# Patient Record
Sex: MALE | Race: WHITE | NOT HISPANIC OR LATINO | Employment: FULL TIME | URBAN - METROPOLITAN AREA
[De-identification: names, ages, dates, MRNs, and addresses within clinical notes are randomized per-mention and may not be internally consistent; named-entity substitution may affect disease eponyms.]

---

## 2019-11-11 ENCOUNTER — OFFICE VISIT (OUTPATIENT)
Dept: FAMILY MEDICINE CLINIC | Facility: CLINIC | Age: 34
End: 2019-11-11

## 2019-11-11 VITALS
OXYGEN SATURATION: 98 % | TEMPERATURE: 97.9 F | DIASTOLIC BLOOD PRESSURE: 70 MMHG | SYSTOLIC BLOOD PRESSURE: 110 MMHG | WEIGHT: 226.8 LBS | HEART RATE: 94 BPM | BODY MASS INDEX: 31.75 KG/M2 | RESPIRATION RATE: 18 BRPM | HEIGHT: 71 IN

## 2019-11-11 DIAGNOSIS — F41.1 GAD (GENERALIZED ANXIETY DISORDER): Primary | ICD-10-CM

## 2019-11-11 PROCEDURE — 99203 OFFICE O/P NEW LOW 30 MIN: CPT | Performed by: NURSE PRACTITIONER

## 2019-11-11 RX ORDER — BUSPIRONE HYDROCHLORIDE 5 MG/1
5 TABLET ORAL 3 TIMES DAILY
Qty: 90 TABLET | Refills: 5 | Status: SHIPPED | OUTPATIENT
Start: 2019-11-11

## 2019-11-11 RX ORDER — DIPHENHYDRAMINE HCL 25 MG
25 TABLET ORAL EVERY 6 HOURS PRN
COMMUNITY

## 2019-11-11 NOTE — PROGRESS NOTES
Subjective   Adrian Montelongo is a 34 y.o. male.     Chief Complaint   Patient presents with   • Anxiety     Mr. Montelongo presents today to establish care at this practice. His wife has accompanied him today. He states that he is having issues with anxiety and thinks he had severe side effects from taking Zoloft, which he discontinued on his own. His associated symptoms are difficulty concentrating, restlessness, insomnia ( can't fall asleep or is up several times during the night) or he is sleeping all of the time during the day. He reports feeling over whelmed. His wife reports that he can't stop talking. All of these symptoms are affecting his performance at work.     I have reviewed the patient's medical history in detail and updated the computerized patient record.    The following portions of the patient's history were reviewed and updated as appropriate: allergies, current medications, past family history, past medical history, past social history, past surgical history and problem list.      Current Outpatient Medications:   •  diphenhydrAMINE (BENADRYL) 25 MG tablet, Take 25 mg by mouth Every 6 (Six) Hours As Needed for Itching., Disp: , Rfl:   •  busPIRone (BUSPAR) 5 MG tablet, Take 1 tablet by mouth 3 (Three) Times a Day., Disp: 90 tablet, Rfl: 5     Review of Systems   Constitutional: Negative.    Cardiovascular: Negative.    Gastrointestinal: Negative.    Neurological: Negative.    Psychiatric/Behavioral: Positive for agitation, decreased concentration, sleep disturbance and stress. Negative for depressed mood. The patient is nervous/anxious.    All other systems reviewed and are negative.      Objective    Vitals:    11/11/19 1451   BP: 110/70   Pulse: 94   Resp: 18   Temp: 97.9 °F (36.6 °C)   SpO2: 98%     Physical Exam   Constitutional: He is oriented to person, place, and time. He appears well-developed and well-nourished.   HENT:   Right Ear: External ear normal. A middle ear effusion is present.   Left  Ear: External ear normal. A middle ear effusion is present.   Mouth/Throat: Oropharynx is clear and moist.   Neck: Normal range of motion. Neck supple. No thyromegaly present.   Cardiovascular: Normal rate, regular rhythm, normal heart sounds and intact distal pulses.   Pulmonary/Chest: Effort normal and breath sounds normal.   Musculoskeletal: Normal range of motion. He exhibits no edema.   Lymphadenopathy:     He has no cervical adenopathy.   Neurological: He is alert and oriented to person, place, and time.   Skin: Skin is warm and dry.   Psychiatric: Judgment and thought content normal. His mood appears anxious. His speech is rapid and/or pressured. Cognition and memory are normal.   He is talking loudly   Vitals reviewed.        Assessment/Plan   Adrian was seen today for anxiety.    Diagnoses and all orders for this visit:    NATTY (generalized anxiety disorder)  -     busPIRone (BUSPAR) 5 MG tablet; Take 1 tablet by mouth 3 (Three) Times a Day.    Mr. Campbell presents today to establish care at this practice. He is having issues with anxiety which is affecting his daily life. He has stopped taking Zoloft because of side effects. I will start him on Buspirone 5 mg three times a day for anxiety.   I have reviewed his medical history that is available in the Epic EMR system.  He is to follow up in 4 weeks on his anxiety medication changes.

## 2019-11-11 NOTE — PROGRESS NOTES
Subjective   Adrian Montelongo is a 34 y.o. male.     History of Present Illness     {Common H&P Review Areas:69254}    Review of Systems    Objective   Physical Exam      Assessment/Plan   {Assess/PlanSmartLinks:23091}

## 2020-04-25 ENCOUNTER — APPOINTMENT (OUTPATIENT)
Dept: CT IMAGING | Facility: HOSPITAL | Age: 35
End: 2020-04-25

## 2020-04-25 ENCOUNTER — APPOINTMENT (OUTPATIENT)
Dept: GENERAL RADIOLOGY | Facility: HOSPITAL | Age: 35
End: 2020-04-25

## 2020-04-25 ENCOUNTER — HOSPITAL ENCOUNTER (OUTPATIENT)
Facility: HOSPITAL | Age: 35
Setting detail: OBSERVATION
Discharge: LEFT BEFORE DISCHARGE | End: 2020-04-25
Attending: EMERGENCY MEDICINE | Admitting: HOSPITALIST

## 2020-04-25 VITALS
RESPIRATION RATE: 18 BRPM | SYSTOLIC BLOOD PRESSURE: 104 MMHG | HEART RATE: 87 BPM | TEMPERATURE: 98 F | BODY MASS INDEX: 28 KG/M2 | DIASTOLIC BLOOD PRESSURE: 85 MMHG | OXYGEN SATURATION: 94 % | HEIGHT: 71 IN | WEIGHT: 200 LBS

## 2020-04-25 DIAGNOSIS — M54.9 MID BACK PAIN: ICD-10-CM

## 2020-04-25 DIAGNOSIS — R42 VERTIGO: ICD-10-CM

## 2020-04-25 DIAGNOSIS — H53.2 DOUBLE VISION: ICD-10-CM

## 2020-04-25 DIAGNOSIS — H53.8 BLURRY VISION, BILATERAL: Primary | ICD-10-CM

## 2020-04-25 DIAGNOSIS — M54.2 NECK PAIN: ICD-10-CM

## 2020-04-25 PROBLEM — F41.9 ANXIETY: Status: ACTIVE | Noted: 2020-04-25

## 2020-04-25 PROBLEM — F90.9 ADHD (ATTENTION DEFICIT HYPERACTIVITY DISORDER): Status: ACTIVE | Noted: 2020-04-25

## 2020-04-25 LAB
ALBUMIN SERPL-MCNC: 4.1 G/DL (ref 3.5–5.2)
ALBUMIN/GLOB SERPL: 1.4 G/DL
ALP SERPL-CCNC: 77 U/L (ref 39–117)
ALT SERPL W P-5'-P-CCNC: 33 U/L (ref 1–41)
ANION GAP SERPL CALCULATED.3IONS-SCNC: 10.6 MMOL/L (ref 5–15)
ANION GAP SERPL CALCULATED.3IONS-SCNC: 11.2 MMOL/L (ref 5–15)
AST SERPL-CCNC: 19 U/L (ref 1–40)
BASOPHILS # BLD AUTO: 0.05 10*3/MM3 (ref 0–0.2)
BASOPHILS NFR BLD AUTO: 0.5 % (ref 0–1.5)
BILIRUB SERPL-MCNC: 0.4 MG/DL (ref 0.2–1.2)
BUN BLD-MCNC: 11 MG/DL (ref 6–20)
BUN BLD-MCNC: 11 MG/DL (ref 6–20)
BUN/CREAT SERPL: 12.5 (ref 7–25)
BUN/CREAT SERPL: 13.9 (ref 7–25)
CALCIUM SPEC-SCNC: 8.9 MG/DL (ref 8.6–10.5)
CALCIUM SPEC-SCNC: 9.3 MG/DL (ref 8.6–10.5)
CHLORIDE SERPL-SCNC: 100 MMOL/L (ref 98–107)
CHLORIDE SERPL-SCNC: 100 MMOL/L (ref 98–107)
CO2 SERPL-SCNC: 22.8 MMOL/L (ref 22–29)
CO2 SERPL-SCNC: 26.4 MMOL/L (ref 22–29)
CREAT BLD-MCNC: 0.79 MG/DL (ref 0.76–1.27)
CREAT BLD-MCNC: 0.88 MG/DL (ref 0.76–1.27)
DEPRECATED RDW RBC AUTO: 40.9 FL (ref 37–54)
DEPRECATED RDW RBC AUTO: 41 FL (ref 37–54)
EOSINOPHIL # BLD AUTO: 0.07 10*3/MM3 (ref 0–0.4)
EOSINOPHIL NFR BLD AUTO: 0.7 % (ref 0.3–6.2)
ERYTHROCYTE [DISTWIDTH] IN BLOOD BY AUTOMATED COUNT: 13 % (ref 12.3–15.4)
ERYTHROCYTE [DISTWIDTH] IN BLOOD BY AUTOMATED COUNT: 13.2 % (ref 12.3–15.4)
GFR SERPL CREATININE-BSD FRML MDRD: 100 ML/MIN/1.73
GFR SERPL CREATININE-BSD FRML MDRD: 113 ML/MIN/1.73
GLOBULIN UR ELPH-MCNC: 3 GM/DL
GLUCOSE BLD-MCNC: 102 MG/DL (ref 65–99)
GLUCOSE BLD-MCNC: 108 MG/DL (ref 65–99)
HCT VFR BLD AUTO: 43 % (ref 37.5–51)
HCT VFR BLD AUTO: 44.8 % (ref 37.5–51)
HGB BLD-MCNC: 14.4 G/DL (ref 13–17.7)
HGB BLD-MCNC: 14.7 G/DL (ref 13–17.7)
IMM GRANULOCYTES # BLD AUTO: 0.04 10*3/MM3 (ref 0–0.05)
IMM GRANULOCYTES NFR BLD AUTO: 0.4 % (ref 0–0.5)
LYMPHOCYTES # BLD AUTO: 2.17 10*3/MM3 (ref 0.7–3.1)
LYMPHOCYTES NFR BLD AUTO: 22.5 % (ref 19.6–45.3)
MCH RBC QN AUTO: 28.4 PG (ref 26.6–33)
MCH RBC QN AUTO: 28.7 PG (ref 26.6–33)
MCHC RBC AUTO-ENTMCNC: 32.8 G/DL (ref 31.5–35.7)
MCHC RBC AUTO-ENTMCNC: 33.5 G/DL (ref 31.5–35.7)
MCV RBC AUTO: 85.8 FL (ref 79–97)
MCV RBC AUTO: 86.5 FL (ref 79–97)
MONOCYTES # BLD AUTO: 0.88 10*3/MM3 (ref 0.1–0.9)
MONOCYTES NFR BLD AUTO: 9.1 % (ref 5–12)
NEUTROPHILS # BLD AUTO: 6.45 10*3/MM3 (ref 1.7–7)
NEUTROPHILS NFR BLD AUTO: 66.8 % (ref 42.7–76)
NRBC BLD AUTO-RTO: 0 /100 WBC (ref 0–0.2)
PLATELET # BLD AUTO: 223 10*3/MM3 (ref 140–450)
PLATELET # BLD AUTO: 230 10*3/MM3 (ref 140–450)
PMV BLD AUTO: 10.3 FL (ref 6–12)
PMV BLD AUTO: 10.4 FL (ref 6–12)
POTASSIUM BLD-SCNC: 4.2 MMOL/L (ref 3.5–5.2)
POTASSIUM BLD-SCNC: 4.3 MMOL/L (ref 3.5–5.2)
PROT SERPL-MCNC: 7.1 G/DL (ref 6–8.5)
RBC # BLD AUTO: 5.01 10*6/MM3 (ref 4.14–5.8)
RBC # BLD AUTO: 5.18 10*6/MM3 (ref 4.14–5.8)
SODIUM BLD-SCNC: 134 MMOL/L (ref 136–145)
SODIUM BLD-SCNC: 137 MMOL/L (ref 136–145)
TROPONIN T SERPL-MCNC: <0.01 NG/ML (ref 0–0.03)
WBC NRBC COR # BLD: 7.85 10*3/MM3 (ref 3.4–10.8)
WBC NRBC COR # BLD: 9.66 10*3/MM3 (ref 3.4–10.8)

## 2020-04-25 PROCEDURE — 96376 TX/PRO/DX INJ SAME DRUG ADON: CPT

## 2020-04-25 PROCEDURE — 96374 THER/PROPH/DIAG INJ IV PUSH: CPT

## 2020-04-25 PROCEDURE — G0378 HOSPITAL OBSERVATION PER HR: HCPCS

## 2020-04-25 PROCEDURE — 25010000002 HYDROMORPHONE PER 4 MG: Performed by: EMERGENCY MEDICINE

## 2020-04-25 PROCEDURE — 85027 COMPLETE CBC AUTOMATED: CPT | Performed by: NURSE PRACTITIONER

## 2020-04-25 PROCEDURE — 0 IOPAMIDOL PER 1 ML: Performed by: EMERGENCY MEDICINE

## 2020-04-25 PROCEDURE — 25010000002 PROCHLORPERAZINE 10 MG/2ML SOLUTION: Performed by: PHYSICIAN ASSISTANT

## 2020-04-25 PROCEDURE — 85025 COMPLETE CBC W/AUTO DIFF WBC: CPT | Performed by: PHYSICIAN ASSISTANT

## 2020-04-25 PROCEDURE — 0042T HC CT CEREBRAL PERFUSION W/WO CONTRAST: CPT

## 2020-04-25 PROCEDURE — 84484 ASSAY OF TROPONIN QUANT: CPT | Performed by: PHYSICIAN ASSISTANT

## 2020-04-25 PROCEDURE — 99285 EMERGENCY DEPT VISIT HI MDM: CPT

## 2020-04-25 PROCEDURE — 93010 ELECTROCARDIOGRAM REPORT: CPT | Performed by: INTERNAL MEDICINE

## 2020-04-25 PROCEDURE — 70496 CT ANGIOGRAPHY HEAD: CPT

## 2020-04-25 PROCEDURE — 70498 CT ANGIOGRAPHY NECK: CPT

## 2020-04-25 PROCEDURE — 96375 TX/PRO/DX INJ NEW DRUG ADDON: CPT

## 2020-04-25 PROCEDURE — 72050 X-RAY EXAM NECK SPINE 4/5VWS: CPT

## 2020-04-25 PROCEDURE — 25010000002 DIPHENHYDRAMINE PER 50 MG: Performed by: PHYSICIAN ASSISTANT

## 2020-04-25 PROCEDURE — 93005 ELECTROCARDIOGRAM TRACING: CPT | Performed by: PHYSICIAN ASSISTANT

## 2020-04-25 PROCEDURE — 80053 COMPREHEN METABOLIC PANEL: CPT | Performed by: PHYSICIAN ASSISTANT

## 2020-04-25 PROCEDURE — 25010000002 ONDANSETRON PER 1 MG: Performed by: EMERGENCY MEDICINE

## 2020-04-25 PROCEDURE — 72072 X-RAY EXAM THORAC SPINE 3VWS: CPT

## 2020-04-25 PROCEDURE — 80048 BASIC METABOLIC PNL TOTAL CA: CPT | Performed by: NURSE PRACTITIONER

## 2020-04-25 PROCEDURE — 25010000002 HYDROMORPHONE 1 MG/ML SOLUTION: Performed by: EMERGENCY MEDICINE

## 2020-04-25 PROCEDURE — 36415 COLL VENOUS BLD VENIPUNCTURE: CPT | Performed by: NURSE PRACTITIONER

## 2020-04-25 RX ORDER — DEXTROAMPHETAMINE SACCHARATE, AMPHETAMINE ASPARTATE, DEXTROAMPHETAMINE SULFATE AND AMPHETAMINE SULFATE 2.5; 2.5; 2.5; 2.5 MG/1; MG/1; MG/1; MG/1
10 TABLET ORAL 2 TIMES DAILY
COMMUNITY

## 2020-04-25 RX ORDER — ASPIRIN 325 MG
325 TABLET ORAL ONCE
Status: COMPLETED | OUTPATIENT
Start: 2020-04-25 | End: 2020-04-25

## 2020-04-25 RX ORDER — SODIUM CHLORIDE 0.9 % (FLUSH) 0.9 %
10 SYRINGE (ML) INJECTION EVERY 12 HOURS SCHEDULED
Status: DISCONTINUED | OUTPATIENT
Start: 2020-04-25 | End: 2020-04-25 | Stop reason: HOSPADM

## 2020-04-25 RX ORDER — LISINOPRIL 5 MG/1
5 TABLET ORAL DAILY
COMMUNITY

## 2020-04-25 RX ORDER — SODIUM CHLORIDE 0.9 % (FLUSH) 0.9 %
10 SYRINGE (ML) INJECTION AS NEEDED
Status: DISCONTINUED | OUTPATIENT
Start: 2020-04-25 | End: 2020-04-25 | Stop reason: HOSPADM

## 2020-04-25 RX ORDER — BISACODYL 5 MG/1
5 TABLET, DELAYED RELEASE ORAL DAILY PRN
Status: DISCONTINUED | OUTPATIENT
Start: 2020-04-25 | End: 2020-04-25 | Stop reason: HOSPADM

## 2020-04-25 RX ORDER — DIPHENHYDRAMINE HYDROCHLORIDE 50 MG/ML
25 INJECTION INTRAMUSCULAR; INTRAVENOUS ONCE
Status: COMPLETED | OUTPATIENT
Start: 2020-04-25 | End: 2020-04-25

## 2020-04-25 RX ORDER — SODIUM CHLORIDE 9 MG/ML
75 INJECTION, SOLUTION INTRAVENOUS CONTINUOUS
Status: DISCONTINUED | OUTPATIENT
Start: 2020-04-25 | End: 2020-04-25 | Stop reason: HOSPADM

## 2020-04-25 RX ORDER — ONDANSETRON 2 MG/ML
4 INJECTION INTRAMUSCULAR; INTRAVENOUS ONCE
Status: COMPLETED | OUTPATIENT
Start: 2020-04-25 | End: 2020-04-25

## 2020-04-25 RX ORDER — ACETAMINOPHEN 160 MG/5ML
650 SOLUTION ORAL EVERY 4 HOURS PRN
Status: DISCONTINUED | OUTPATIENT
Start: 2020-04-25 | End: 2020-04-25 | Stop reason: HOSPADM

## 2020-04-25 RX ORDER — ACETAMINOPHEN 650 MG/1
650 SUPPOSITORY RECTAL EVERY 4 HOURS PRN
Status: DISCONTINUED | OUTPATIENT
Start: 2020-04-25 | End: 2020-04-25 | Stop reason: HOSPADM

## 2020-04-25 RX ORDER — ONDANSETRON 2 MG/ML
4 INJECTION INTRAMUSCULAR; INTRAVENOUS EVERY 6 HOURS PRN
Status: DISCONTINUED | OUTPATIENT
Start: 2020-04-25 | End: 2020-04-25 | Stop reason: HOSPADM

## 2020-04-25 RX ORDER — CALCIUM CARBONATE 200(500)MG
2 TABLET,CHEWABLE ORAL 2 TIMES DAILY PRN
Status: DISCONTINUED | OUTPATIENT
Start: 2020-04-25 | End: 2020-04-25 | Stop reason: HOSPADM

## 2020-04-25 RX ORDER — PROCHLORPERAZINE EDISYLATE 5 MG/ML
10 INJECTION INTRAMUSCULAR; INTRAVENOUS EVERY 6 HOURS PRN
Status: DISCONTINUED | OUTPATIENT
Start: 2020-04-25 | End: 2020-04-25 | Stop reason: HOSPADM

## 2020-04-25 RX ORDER — HYDROMORPHONE HYDROCHLORIDE 1 MG/ML
0.5 INJECTION, SOLUTION INTRAMUSCULAR; INTRAVENOUS; SUBCUTANEOUS ONCE
Status: COMPLETED | OUTPATIENT
Start: 2020-04-25 | End: 2020-04-25

## 2020-04-25 RX ORDER — ACETAMINOPHEN 325 MG/1
650 TABLET ORAL EVERY 4 HOURS PRN
Status: DISCONTINUED | OUTPATIENT
Start: 2020-04-25 | End: 2020-04-25 | Stop reason: HOSPADM

## 2020-04-25 RX ORDER — ONDANSETRON 4 MG/1
4 TABLET, FILM COATED ORAL EVERY 6 HOURS PRN
Status: DISCONTINUED | OUTPATIENT
Start: 2020-04-25 | End: 2020-04-25 | Stop reason: HOSPADM

## 2020-04-25 RX ADMIN — DIPHENHYDRAMINE HYDROCHLORIDE 25 MG: 50 INJECTION, SOLUTION INTRAMUSCULAR; INTRAVENOUS at 05:16

## 2020-04-25 RX ADMIN — SODIUM CHLORIDE 1000 ML: 9 INJECTION, SOLUTION INTRAVENOUS at 05:15

## 2020-04-25 RX ADMIN — HYDROMORPHONE HYDROCHLORIDE 0.5 MG: 1 INJECTION, SOLUTION INTRAMUSCULAR; INTRAVENOUS; SUBCUTANEOUS at 02:44

## 2020-04-25 RX ADMIN — ONDANSETRON 4 MG: 2 INJECTION INTRAMUSCULAR; INTRAVENOUS at 02:44

## 2020-04-25 RX ADMIN — ACETAMINOPHEN 650 MG: 325 TABLET, FILM COATED ORAL at 09:16

## 2020-04-25 RX ADMIN — Medication 10 ML: at 09:07

## 2020-04-25 RX ADMIN — HYDROMORPHONE HYDROCHLORIDE 1 MG: 1 INJECTION, SOLUTION INTRAMUSCULAR; INTRAVENOUS; SUBCUTANEOUS at 04:53

## 2020-04-25 RX ADMIN — IOPAMIDOL 150 ML: 755 INJECTION, SOLUTION INTRAVENOUS at 03:17

## 2020-04-25 RX ADMIN — PROCHLORPERAZINE EDISYLATE 10 MG: 5 INJECTION INTRAMUSCULAR; INTRAVENOUS at 05:16

## 2020-04-25 RX ADMIN — SODIUM CHLORIDE 75 ML/HR: 9 INJECTION, SOLUTION INTRAVENOUS at 09:12

## 2020-04-25 RX ADMIN — ASPIRIN 325 MG: 325 TABLET ORAL at 05:18

## 2020-04-25 NOTE — H&P
HISTORY AND PHYSICAL   [facility]        Patient Identification:  Name: Adrian Montelongo  Age: 34 y.o.  Sex: male  :  1985  MRN: [id number]                     Primary Care Physician: Cee Elaine APRN    Chief Complaint: Blurred vision and vertigo    History of Present Illness:         The patient is a 34 y.o. male who presents to the ED c/o sudden onset of blurry vision, vertigo, neck pain and mid back pain that occurred approximately 7-1/2 hours prior to coming to the ER while mowing his lawn.  The patient states he did not see a ditch/drop-off and came down hard on his left foot while mowing the lawn.  All his symptoms immediately began after this TITO.  He states initially the blurry vision was mild but has progressed over the past several hours.  He states that this particular time he can see pictures but cannot make out any of the details.  He states his vision is normally 20/20 and he has not had to wear corrective lenses to date.  He denies headache, palpitations, chest pain, abdominal pain, fever/chills.  He is unaware of any exposure to sick contacts prior to the onset of his symptoms.       Past Medical History:  Past Medical History:   Diagnosis Date   • ADHD (attention deficit hyperactivity disorder)    • Anxiety      Past Surgical History:  Past Surgical History:   Procedure Laterality Date   • KNEE SURGERY        Home Meds:  No medications prior to admission.     Current meds    Current Facility-Administered Medications:   •  acetaminophen (TYLENOL) tablet 650 mg, 650 mg, Oral, Q4H PRN, 650 mg at 20 0916 **OR** acetaminophen (TYLENOL) 160 MG/5ML solution 650 mg, 650 mg, Oral, Q4H PRN **OR** acetaminophen (TYLENOL) suppository 650 mg, 650 mg, Rectal, Q4H PRN, Roxanne Ledesma APRN  •  bisacodyl (DULCOLAX) EC tablet 5 mg, 5 mg, Oral, Daily PRN, Roxanne Ledesma APRN  •  calcium carbonate (TUMS) chewable tablet 500 mg (200 mg elemental), 2 tablet, Oral, BID PRN,  Roxanne Ledesma APRN  •  ondansetron (ZOFRAN) tablet 4 mg, 4 mg, Oral, Q6H PRN **OR** ondansetron (ZOFRAN) injection 4 mg, 4 mg, Intravenous, Q6H PRN, Roxanne Ledesma APRN  •  prochlorperazine (COMPAZINE) injection 10 mg, 10 mg, Intravenous, Q6H PRN, Stone Pina III, PA, 10 mg at 04/25/20 0516  •  sodium chloride 0.9 % flush 10 mL, 10 mL, Intravenous, Q12H, Roxanne Ledesma APRN, 10 mL at 04/25/20 0907  •  sodium chloride 0.9 % flush 10 mL, 10 mL, Intravenous, PRN, Roxanne Ledesma APRN  •  sodium chloride 0.9 % infusion, 75 mL/hr, Intravenous, Continuous, Roxanne Ledesma APRN, Last Rate: 75 mL/hr at 04/25/20 0912, 75 mL/hr at 04/25/20 0912    Current Outpatient Medications:   •  amphetamine-dextroamphetamine (ADDERALL) 10 MG tablet, Take 10 mg by mouth 2 (Two) Times a Day., Disp: , Rfl:   •  lisinopril (PRINIVIL,ZESTRIL) 5 MG tablet, Take 5 mg by mouth Daily., Disp: , Rfl:   •  busPIRone (BUSPAR) 5 MG tablet, Take 1 tablet by mouth 3 (Three) Times a Day., Disp: 90 tablet, Rfl: 5  •  diphenhydrAMINE (BENADRYL) 25 MG tablet, Take 25 mg by mouth Every 6 (Six) Hours As Needed for Itching., Disp: , Rfl:   Allergies:  No Known Allergies  Immunizations:    There is no immunization history on file for this patient.  Social History:   Social History     Social History Narrative   • Not on file     Social History     Socioeconomic History   • Marital status:      Spouse name: Not on file   • Number of children: Not on file   • Years of education: Not on file   • Highest education level: Not on file   Tobacco Use   • Smoking status: Never Smoker   • Smokeless tobacco: Never Used   Substance and Sexual Activity   • Alcohol use: No     Frequency: Never   • Drug use: No   • Sexual activity: Yes     Partners: Female       Family History:  Family History   Problem Relation Age of Onset   • Lung cancer Mother    • No Known Problems Father    • Skin cancer Sister    • No Known Problems  "Maternal Grandmother    • Alzheimer's disease Maternal Grandfather    • No Known Problems Paternal Grandmother    • Unexplained death Paternal Grandfather         MVA        Review of Systems  See history of present illness and past medical history.  Patient denies  change in hearing, change in taste, changes in weight, changes in appetite, focal weakness, numbness, or paresthesia.  Patient denies chest pain, palpitations, dyspnea, orthopnea, PND, cough, sinus pressure, rhinorrhea, epistaxis, hemoptysis, nausea, vomiting, hematemesis, diarrhea, constipation or hematchezia.  Denies cold or heat intolerance, polydipsia, polyuria, polyphagia. Denies hematuria, pyuria, dysuria, hesitancy, frequency or urgency.  Remainder of ROS is negative.    Objective:  tMax 24 hrs: Temp (24hrs), Av.4 °F (36.3 °C), Min:96.8 °F (36 °C), Max:98 °F (36.7 °C)    Vitals Ranges:   Temp:  [96.8 °F (36 °C)-98 °F (36.7 °C)] 98 °F (36.7 °C)  Heart Rate:  [] 87  Resp:  [16-18] 18  BP: (102-133)/(75-93) 104/85      Exam:  /85 (Patient Position: Lying)   Pulse 87   Temp 98 °F (36.7 °C) (Oral)   Resp 18   Ht 180.3 cm (71\")   Wt 90.7 kg (200 lb)   SpO2 94%   BMI 27.89 kg/m²     General Appearance:    Alert, cooperative, no distress, appears stated age   Head:    Normocephalic, without obvious abnormality, atraumatic   Eyes:    PERRL, conjunctiva/corneas clear, EOM's intact, both eyes   Ears:    Normal external ear canals, both ears   Nose:   Nares normal, septum midline, mucosa normal, no drainage    or sinus tenderness   Throat:   Lips, mucosa, and tongue normal   Neck:   Supple, symmetrical, trachea midline, no adenopathy;     thyroid:  no enlargement/tenderness/nodules; no carotid    bruit or JVD   Back:     Symmetric, no curvature, ROM normal, no CVA tenderness   Lungs:     Clear to auscultation bilaterally, respirations unlabored   Chest Wall:    No tenderness or deformity    Heart:    Regular rate and rhythm, S1 and S2 " normal, no murmur, rub   or gallop   Abdomen:     Soft, non-tender, bowel sounds active all four quadrants,     no masses, no hepatomegaly, no splenomegaly   Extremities:   Extremities normal, atraumatic, no cyanosis or edema   Pulses:   2+ and symmetric all extremities   Skin:   Skin color, texture, turgor normal, no rashes or lesions   Lymph nodes:   Cervical, supraclavicular, and axillary nodes normal   Neurologic:   CNII-XII intact, normal strength, sensation intact throughout      .    Data Review:  Lab Results (last 72 hours)     Procedure Component Value Units Date/Time    Basic Metabolic Panel [[id number]]  (Abnormal) Collected:  04/25/20 0839    Specimen:  Blood from Hand, Right Updated:  04/25/20 0916     Glucose 102 mg/dL      BUN 11 mg/dL      Creatinine 0.79 mg/dL      Sodium 134 mmol/L      Potassium 4.2 mmol/L      Chloride 100 mmol/L      CO2 22.8 mmol/L      Calcium 8.9 mg/dL      eGFR Non African Amer 113 mL/min/1.73      BUN/Creatinine Ratio 13.9     Anion Gap 11.2 mmol/L     Narrative:       GFR Normal >60  Chronic Kidney Disease <60  Kidney Failure <15      CBC (No Diff) [[id number]]  (Normal) Collected:  04/25/20 0839    Specimen:  Blood Updated:  04/25/20 0851     WBC 7.85 10*3/mm3      RBC 5.01 10*6/mm3      Hemoglobin 14.4 g/dL      Hematocrit 43.0 %      MCV 85.8 fL      MCH 28.7 pg      MCHC 33.5 g/dL      RDW 13.2 %      RDW-SD 41.0 fl      MPV 10.3 fL      Platelets 230 10*3/mm3     Troponin [[id number]]  (Normal) Collected:  04/25/20 0146    Specimen:  Blood Updated:  04/25/20 0551     Troponin T <0.010 ng/mL     Narrative:       Troponin T Reference Range:  <= 0.03 ng/mL-   Negative for AMI  >0.03 ng/mL-     Abnormal for myocardial necrosis.  Clinicians would have to utilize clinical acumen, EKG, Troponin and serial changes to determine if it is an Acute Myocardial Infarction or myocardial injury due to an underlying chronic condition.       Results may be falsely decreased if patient  taking Biotin.      Comprehensive Metabolic Panel [[id number]]  (Abnormal) Collected:  04/25/20 0146    Specimen:  Blood Updated:  04/25/20 0216     Glucose 108 mg/dL      BUN 11 mg/dL      Creatinine 0.88 mg/dL      Sodium 137 mmol/L      Potassium 4.3 mmol/L      Chloride 100 mmol/L      CO2 26.4 mmol/L      Calcium 9.3 mg/dL      Total Protein 7.1 g/dL      Albumin 4.10 g/dL      ALT (SGPT) 33 U/L      AST (SGOT) 19 U/L      Alkaline Phosphatase 77 U/L      Total Bilirubin 0.4 mg/dL      eGFR Non African Amer 100 mL/min/1.73      Globulin 3.0 gm/dL      A/G Ratio 1.4 g/dL      BUN/Creatinine Ratio 12.5     Anion Gap 10.6 mmol/L     Narrative:       GFR Normal >60  Chronic Kidney Disease <60  Kidney Failure <15      CBC & Differential [[id number]] Collected:  04/25/20 0146    Specimen:  Blood Updated:  04/25/20 0155    Narrative:       The following orders were created for panel order CBC & Differential.  Procedure                               Abnormality         Status                     ---------                               -----------         ------                     CBC Auto Differential[[id number]]        Normal              Final result                 Please view results for these tests on the individual orders.    CBC Auto Differential [[id number]]  (Normal) Collected:  04/25/20 0146    Specimen:  Blood Updated:  04/25/20 0155     WBC 9.66 10*3/mm3      RBC 5.18 10*6/mm3      Hemoglobin 14.7 g/dL      Hematocrit 44.8 %      MCV 86.5 fL      MCH 28.4 pg      MCHC 32.8 g/dL      RDW 13.0 %      RDW-SD 40.9 fl      MPV 10.4 fL      Platelets 223 10*3/mm3      Neutrophil % 66.8 %      Lymphocyte % 22.5 %      Monocyte % 9.1 %      Eosinophil % 0.7 %      Basophil % 0.5 %      Immature Grans % 0.4 %      Neutrophils, Absolute 6.45 10*3/mm3      Lymphocytes, Absolute 2.17 10*3/mm3      Monocytes, Absolute 0.88 10*3/mm3      Eosinophils, Absolute 0.07 10*3/mm3      Basophils, Absolute 0.05 10*3/mm3       Immature Grans, Absolute 0.04 10*3/mm3      nRBC 0.0 /100 WBC                    Imaging Results (All)     Procedure Component Value Units Date/Time    CT Angiogram Head [[id number]] Collected:  04/25/20 0330     Updated:  04/25/20 0345    Narrative:       CT ANGIOGRAM HEAD-, CT CEREBRAL PERFUSION W WO CONTRAST-, CT ANGIOGRAM  NECK-     CLINICAL HISTORY: Sudden onset of headache. Possible acute CVA.     TECHNIQUE: Transverse 3 mm thick images were obtained from the base of  the skull to the vertex without IV contrast. Subsequently brain  perfusion imaging was acquired. Spiral CT images were then acquired  through the neck and head during rapid IV injection of contrast and were  reconstructed in 1 mm thick axial slices. Multiple coronal and sagittal  and 3-D reconstructions were produced.     Radiation dose reduction techniques were utilized, including automated  exposure control and exposure modulation based on body size.     COMPARISON: None     FINDINGS: The precontrast images demonstrate no evidence of acute  infarct or hemorrhage. The brain and ventricular system appear  structurally normal. There is normal branching of the great vessels from  the aortic arch without NASCET significant stenosis. No stenoses are  identified in either common or external or internal carotid artery  within the neck. The carotid bifurcations appear normal. The vertebral  arteries are codominant, and are both widely patent throughout their  course in the neck and head. No intracranial stenoses or occlusions are  identified. There are no aneurysms. The brain perfusion images  demonstrate no evidence of ischemia or infarction. Postcontrast images  of the brain parenchyma demonstrate no enhancing lesions. There are no  masses. Small polyps are noted within both maxillary sinuses.       Impression:       Unremarkable CT angiogram imaging of the neck and head  except for small polyps within both maxillary sinuses. There is no  evidence of  cerebral infarction or ischemia.     This report was finalized on 4/25/2020 3:42 AM by Dr. Chano Espinoza M.D.       CT Cerebral Perfusion With & Without Contrast [[id number]] Collected:  04/25/20 0330     Updated:  04/25/20 0345    Narrative:       CT ANGIOGRAM HEAD-, CT CEREBRAL PERFUSION W WO CONTRAST-, CT ANGIOGRAM  NECK-     CLINICAL HISTORY: Sudden onset of headache. Possible acute CVA.     TECHNIQUE: Transverse 3 mm thick images were obtained from the base of  the skull to the vertex without IV contrast. Subsequently brain  perfusion imaging was acquired. Spiral CT images were then acquired  through the neck and head during rapid IV injection of contrast and were  reconstructed in 1 mm thick axial slices. Multiple coronal and sagittal  and 3-D reconstructions were produced.     Radiation dose reduction techniques were utilized, including automated  exposure control and exposure modulation based on body size.     COMPARISON: None     FINDINGS: The precontrast images demonstrate no evidence of acute  infarct or hemorrhage. The brain and ventricular system appear  structurally normal. There is normal branching of the great vessels from  the aortic arch without NASCET significant stenosis. No stenoses are  identified in either common or external or internal carotid artery  within the neck. The carotid bifurcations appear normal. The vertebral  arteries are codominant, and are both widely patent throughout their  course in the neck and head. No intracranial stenoses or occlusions are  identified. There are no aneurysms. The brain perfusion images  demonstrate no evidence of ischemia or infarction. Postcontrast images  of the brain parenchyma demonstrate no enhancing lesions. There are no  masses. Small polyps are noted within both maxillary sinuses.       Impression:       Unremarkable CT angiogram imaging of the neck and head  except for small polyps within both maxillary sinuses. There is no  evidence of  cerebral infarction or ischemia.     This report was finalized on 4/25/2020 3:42 AM by Dr. Chano Espinoza M.D.       CT Angiogram Neck [[id number]] Collected:  04/25/20 0330     Updated:  04/25/20 0345    Narrative:       CT ANGIOGRAM HEAD-, CT CEREBRAL PERFUSION W WO CONTRAST-, CT ANGIOGRAM  NECK-     CLINICAL HISTORY: Sudden onset of headache. Possible acute CVA.     TECHNIQUE: Transverse 3 mm thick images were obtained from the base of  the skull to the vertex without IV contrast. Subsequently brain  perfusion imaging was acquired. Spiral CT images were then acquired  through the neck and head during rapid IV injection of contrast and were  reconstructed in 1 mm thick axial slices. Multiple coronal and sagittal  and 3-D reconstructions were produced.     Radiation dose reduction techniques were utilized, including automated  exposure control and exposure modulation based on body size.     COMPARISON: None     FINDINGS: The precontrast images demonstrate no evidence of acute  infarct or hemorrhage. The brain and ventricular system appear  structurally normal. There is normal branching of the great vessels from  the aortic arch without NASCET significant stenosis. No stenoses are  identified in either common or external or internal carotid artery  within the neck. The carotid bifurcations appear normal. The vertebral  arteries are codominant, and are both widely patent throughout their  course in the neck and head. No intracranial stenoses or occlusions are  identified. There are no aneurysms. The brain perfusion images  demonstrate no evidence of ischemia or infarction. Postcontrast images  of the brain parenchyma demonstrate no enhancing lesions. There are no  masses. Small polyps are noted within both maxillary sinuses.       Impression:       Unremarkable CT angiogram imaging of the neck and head  except for small polyps within both maxillary sinuses. There is no  evidence of cerebral infarction or ischemia.      This report was finalized on 4/25/2020 3:42 AM by Dr. Chano Espinoza M.D.       XR Spine Thoracic 3 View [[id number]] Collected:  04/25/20 0224     Updated:  04/25/20 0229    Narrative:       THORACIC SPINE AND CERVICAL SPINE X-RAYS.     CLINICAL HISTORY: Injury. Neck and back pain     Thoracic spine:     AP and lateral views demonstrate no abnormalities. There is normal  alignment. All of the vertebral bodies are normal in height. There is no  evidence of recent or old fracture.     Cervical spine:     5 views were obtained.     There is normal alignment. All of the disc spaces and vertebral bodies  are normal in height. There is no evidence of recent or old fracture or  subluxation.     This report was finalized on 4/25/2020 2:25 AM by Dr. Chano Espinoza M.D.       XR Spine Cervical Complete 4 or 5 View [[id number]] Collected:  04/25/20 0224     Updated:  04/25/20 0229    Narrative:       THORACIC SPINE AND CERVICAL SPINE X-RAYS.     CLINICAL HISTORY: Injury. Neck and back pain     Thoracic spine:     AP and lateral views demonstrate no abnormalities. There is normal  alignment. All of the vertebral bodies are normal in height. There is no  evidence of recent or old fracture.     Cervical spine:     5 views were obtained.     There is normal alignment. All of the disc spaces and vertebral bodies  are normal in height. There is no evidence of recent or old fracture or  subluxation.     This report was finalized on 4/25/2020 2:25 AM by Dr. Chano Espinoza M.D.           Past Medical History:   Diagnosis Date   • ADHD (attention deficit hyperactivity disorder)    • Anxiety        Assessment:  Active Hospital Problems    Diagnosis  POA   • **Blurry vision, bilateral [H53.8]  Yes   • Double vision [H53.2]  Unknown   • Neck pain [M54.2]  Unknown   • Vertigo [R42]  Unknown   • Mid back pain [M54.9]  Unknown   • Anxiety [F41.9]  Unknown   • ADHD (attention deficit hyperactivity disorder) [F90.9]  Unknown      Resolved  Hospital Problems   No resolved problems to display.       Plan:  The patient was admitted to the hospital for further work-up for symptoms of blurred vision with double vision vertigo and back pain.  After being in the hospital for a short time he decided that he was feeling better and decided to leave AGAINST MEDICAL ADVICE.  It is unknown if he will follow-up with anyone.    Gerry Wilkinson MD  4/25/2020  09:55

## 2020-04-25 NOTE — ED PROVIDER NOTES
Brief history of present illness: 34-year-old male with sudden onset blurry vision, dizziness, neck and mid back pain status post misstep while mowing.  Blurry vision has been been persistent though intensity waxes and wanes and even some double vision has developed.  No headache.  No other focal numbness or weakness.    Physical exam:   Alert, oriented, no acute distress.  Painless range of motion of the neck noted.  Moves all extremities equally.  Finger-to-nose normal.  With one eye closed patient can tell virginie-fingers and holding up in a 20 feet but with both eyes open he has difficulty because he tells me he has double vision.    MDM:    Results for orders placed or performed during the hospital encounter of 04/25/20   Comprehensive Metabolic Panel   Result Value Ref Range    Glucose 108 (H) 65 - 99 mg/dL    BUN 11 6 - 20 mg/dL    Creatinine 0.88 0.76 - 1.27 mg/dL    Sodium 137 136 - 145 mmol/L    Potassium 4.3 3.5 - 5.2 mmol/L    Chloride 100 98 - 107 mmol/L    CO2 26.4 22.0 - 29.0 mmol/L    Calcium 9.3 8.6 - 10.5 mg/dL    Total Protein 7.1 6.0 - 8.5 g/dL    Albumin 4.10 3.50 - 5.20 g/dL    ALT (SGPT) 33 1 - 41 U/L    AST (SGOT) 19 1 - 40 U/L    Alkaline Phosphatase 77 39 - 117 U/L    Total Bilirubin 0.4 0.2 - 1.2 mg/dL    eGFR Non African Amer 100 >60 mL/min/1.73    Globulin 3.0 gm/dL    A/G Ratio 1.4 g/dL    BUN/Creatinine Ratio 12.5 7.0 - 25.0    Anion Gap 10.6 5.0 - 15.0 mmol/L   CBC Auto Differential   Result Value Ref Range    WBC 9.66 3.40 - 10.80 10*3/mm3    RBC 5.18 4.14 - 5.80 10*6/mm3    Hemoglobin 14.7 13.0 - 17.7 g/dL    Hematocrit 44.8 37.5 - 51.0 %    MCV 86.5 79.0 - 97.0 fL    MCH 28.4 26.6 - 33.0 pg    MCHC 32.8 31.5 - 35.7 g/dL    RDW 13.0 12.3 - 15.4 %    RDW-SD 40.9 37.0 - 54.0 fl    MPV 10.4 6.0 - 12.0 fL    Platelets 223 140 - 450 10*3/mm3    Neutrophil % 66.8 42.7 - 76.0 %    Lymphocyte % 22.5 19.6 - 45.3 %    Monocyte % 9.1 5.0 - 12.0 %    Eosinophil % 0.7 0.3 - 6.2 %    Basophil % 0.5  0.0 - 1.5 %    Immature Grans % 0.4 0.0 - 0.5 %    Neutrophils, Absolute 6.45 1.70 - 7.00 10*3/mm3    Lymphocytes, Absolute 2.17 0.70 - 3.10 10*3/mm3    Monocytes, Absolute 0.88 0.10 - 0.90 10*3/mm3    Eosinophils, Absolute 0.07 0.00 - 0.40 10*3/mm3    Basophils, Absolute 0.05 0.00 - 0.20 10*3/mm3    Immature Grans, Absolute 0.04 0.00 - 0.05 10*3/mm3    nRBC 0.0 0.0 - 0.2 /100 WBC     Ct Angiogram Head    Result Date: 4/25/2020  Narrative: CT ANGIOGRAM HEAD-, CT CEREBRAL PERFUSION W WO CONTRAST-, CT ANGIOGRAM NECK-  CLINICAL HISTORY: Sudden onset of headache. Possible acute CVA.  TECHNIQUE: Transverse 3 mm thick images were obtained from the base of the skull to the vertex without IV contrast. Subsequently brain perfusion imaging was acquired. Spiral CT images were then acquired through the neck and head during rapid IV injection of contrast and were reconstructed in 1 mm thick axial slices. Multiple coronal and sagittal and 3-D reconstructions were produced.  Radiation dose reduction techniques were utilized, including automated exposure control and exposure modulation based on body size.  COMPARISON: None  FINDINGS: The precontrast images demonstrate no evidence of acute infarct or hemorrhage. The brain and ventricular system appear structurally normal. There is normal branching of the great vessels from the aortic arch without NASCET significant stenosis. No stenoses are identified in either common or external or internal carotid artery within the neck. The carotid bifurcations appear normal. The vertebral arteries are codominant, and are both widely patent throughout their course in the neck and head. No intracranial stenoses or occlusions are identified. There are no aneurysms. The brain perfusion images demonstrate no evidence of ischemia or infarction. Postcontrast images of the brain parenchyma demonstrate no enhancing lesions. There are no masses. Small polyps are noted within both maxillary sinuses.       Impression: Unremarkable CT angiogram imaging of the neck and head except for small polyps within both maxillary sinuses. There is no evidence of cerebral infarction or ischemia.  This report was finalized on 4/25/2020 3:42 AM by Dr. Chano Espinoza M.D.      Xr Spine Cervical Complete 4 Or 5 View    Result Date: 4/25/2020  Narrative: THORACIC SPINE AND CERVICAL SPINE X-RAYS.  CLINICAL HISTORY: Injury. Neck and back pain  Thoracic spine:  AP and lateral views demonstrate no abnormalities. There is normal alignment. All of the vertebral bodies are normal in height. There is no evidence of recent or old fracture.  Cervical spine:  5 views were obtained.  There is normal alignment. All of the disc spaces and vertebral bodies are normal in height. There is no evidence of recent or old fracture or subluxation.  This report was finalized on 4/25/2020 2:25 AM by Dr. Chano Espinoza M.D.      Xr Spine Thoracic 3 View    Result Date: 4/25/2020  Narrative: THORACIC SPINE AND CERVICAL SPINE X-RAYS.  CLINICAL HISTORY: Injury. Neck and back pain  Thoracic spine:  AP and lateral views demonstrate no abnormalities. There is normal alignment. All of the vertebral bodies are normal in height. There is no evidence of recent or old fracture.  Cervical spine:  5 views were obtained.  There is normal alignment. All of the disc spaces and vertebral bodies are normal in height. There is no evidence of recent or old fracture or subluxation.  This report was finalized on 4/25/2020 2:25 AM by Dr. Chano Espinoza M.D.      Ct Angiogram Neck    Result Date: 4/25/2020  Narrative: CT ANGIOGRAM HEAD-, CT CEREBRAL PERFUSION W WO CONTRAST-, CT ANGIOGRAM NECK-  CLINICAL HISTORY: Sudden onset of headache. Possible acute CVA.  TECHNIQUE: Transverse 3 mm thick images were obtained from the base of the skull to the vertex without IV contrast. Subsequently brain perfusion imaging was acquired. Spiral CT images were then acquired through the neck and  head during rapid IV injection of contrast and were reconstructed in 1 mm thick axial slices. Multiple coronal and sagittal and 3-D reconstructions were produced.  Radiation dose reduction techniques were utilized, including automated exposure control and exposure modulation based on body size.  COMPARISON: None  FINDINGS: The precontrast images demonstrate no evidence of acute infarct or hemorrhage. The brain and ventricular system appear structurally normal. There is normal branching of the great vessels from the aortic arch without NASCET significant stenosis. No stenoses are identified in either common or external or internal carotid artery within the neck. The carotid bifurcations appear normal. The vertebral arteries are codominant, and are both widely patent throughout their course in the neck and head. No intracranial stenoses or occlusions are identified. There are no aneurysms. The brain perfusion images demonstrate no evidence of ischemia or infarction. Postcontrast images of the brain parenchyma demonstrate no enhancing lesions. There are no masses. Small polyps are noted within both maxillary sinuses.      Impression: Unremarkable CT angiogram imaging of the neck and head except for small polyps within both maxillary sinuses. There is no evidence of cerebral infarction or ischemia.  This report was finalized on 4/25/2020 3:42 AM by Dr. Chano Espinoza M.D.      Ct Cerebral Perfusion With & Without Contrast    Result Date: 4/25/2020  Narrative: CT ANGIOGRAM HEAD-, CT CEREBRAL PERFUSION W WO CONTRAST-, CT ANGIOGRAM NECK-  CLINICAL HISTORY: Sudden onset of headache. Possible acute CVA.  TECHNIQUE: Transverse 3 mm thick images were obtained from the base of the skull to the vertex without IV contrast. Subsequently brain perfusion imaging was acquired. Spiral CT images were then acquired through the neck and head during rapid IV injection of contrast and were reconstructed in 1 mm thick axial slices. Multiple  coronal and sagittal and 3-D reconstructions were produced.  Radiation dose reduction techniques were utilized, including automated exposure control and exposure modulation based on body size.  COMPARISON: None  FINDINGS: The precontrast images demonstrate no evidence of acute infarct or hemorrhage. The brain and ventricular system appear structurally normal. There is normal branching of the great vessels from the aortic arch without NASCET significant stenosis. No stenoses are identified in either common or external or internal carotid artery within the neck. The carotid bifurcations appear normal. The vertebral arteries are codominant, and are both widely patent throughout their course in the neck and head. No intracranial stenoses or occlusions are identified. There are no aneurysms. The brain perfusion images demonstrate no evidence of ischemia or infarction. Postcontrast images of the brain parenchyma demonstrate no enhancing lesions. There are no masses. Small polyps are noted within both maxillary sinuses.      Impression: Unremarkable CT angiogram imaging of the neck and head except for small polyps within both maxillary sinuses. There is no evidence of cerebral infarction or ischemia.  This report was finalized on 4/25/2020 3:42 AM by Dr. Chano Espinoza M.D.      Agree with plan for consultation with stroke neurology likely admission for further evaluation in the morning with potential need for MRI and ophthalmology consultation.    I have seen and personally evaluated this patient, discussed the case with the treating advanced practice provider, and reviewed their note. I was involved in the medical decision making during the evaluation, testing and disposition planning for this patient.     Preston Jackson MD  04/25/20 0429

## 2020-04-25 NOTE — DISCHARGE SUMMARY
PHYSICIAN DISCHARGE SUMMARY                                                                        [facility]    Patient Identification:  Name: Adrian Montelongo  Age: 34 y.o.  Sex: male  :  1985  MRN: [id number]  Primary Care Physician: Cee Elaine APRN    Admit date: 2020  Discharge date and time:2020  Discharged Condition: Left AGAINST MEDICAL ADVICE    Discharge Diagnoses:  Active Hospital Problems    Diagnosis  POA   • **Blurry vision, bilateral [H53.8]  Yes   • Double vision [H53.2]  Unknown   • Neck pain [M54.2]  Unknown   • Vertigo [R42]  Unknown   • Mid back pain [M54.9]  Unknown   • Anxiety [F41.9]  Unknown   • ADHD (attention deficit hyperactivity disorder) [F90.9]  Unknown      Resolved Hospital Problems   No resolved problems to display.          PMHX:   Past Medical History:   Diagnosis Date   • ADHD (attention deficit hyperactivity disorder)    • Anxiety      PSHX:   Past Surgical History:   Procedure Laterality Date   • KNEE SURGERY         Hospital Course: Adrian Montelongo   is a 34 y.o. male who presents to the ED c/o sudden onset of blurry vision, vertigo, neck pain and mid back pain that occurred approximately 7-1/2 hours prior to coming to the ER while mowing his lawn.  The patient states he did not see a ditch/drop-off and came down hard on his left foot while mowing the lawn.  All his symptoms immediately began after this TITO.  He states initially the blurry vision was mild but has progressed over the past several hours.  He states that this particular time he can see pictures but cannot make out any of the details.  He states his vision is normally 20/20 and he has not had to wear corrective lenses to date.  He denies headache, palpitations, chest pain, abdominal pain, fever/chills.  He is unaware of any exposure to sick contacts prior to the onset of his symptoms.         The patient was  admitted to the hospital for further work-up for symptoms of blurred vision with double vision vertigo and back pain.  After being in the hospital for a short time he decided that he was feeling better and decided to leave AGAINST MEDICAL ADVICE.  It is unknown if he will follow-up with anyone.      Consults:     Consults     Date and Time Order Name Status Description    4/25/2020 0417 LAVERN (on-call MD unless specified) Details Completed         Results from last 7 days   Lab Units 04/25/20  0839   WBC 10*3/mm3 7.85   HEMOGLOBIN g/dL 14.4   HEMATOCRIT % 43.0   PLATELETS 10*3/mm3 230     Results from last 7 days   Lab Units 04/25/20  0839   SODIUM mmol/L 134*   POTASSIUM mmol/L 4.2   CHLORIDE mmol/L 100   CO2 mmol/L 22.8   BUN mg/dL 11   CREATININE mg/dL 0.79   GLUCOSE mg/dL 102*   CALCIUM mg/dL 8.9     Significant Diagnostic Studies:   WBC   Date Value Ref Range Status   04/25/2020 7.85 3.40 - 10.80 10*3/mm3 Final     Hemoglobin   Date Value Ref Range Status   04/25/2020 14.4 13.0 - 17.7 g/dL Final     Hematocrit   Date Value Ref Range Status   04/25/2020 43.0 37.5 - 51.0 % Final     Platelets   Date Value Ref Range Status   04/25/2020 230 140 - 450 10*3/mm3 Final     Sodium   Date Value Ref Range Status   04/25/2020 134 (L) 136 - 145 mmol/L Final     Potassium   Date Value Ref Range Status   04/25/2020 4.2 3.5 - 5.2 mmol/L Final     Chloride   Date Value Ref Range Status   04/25/2020 100 98 - 107 mmol/L Final     CO2   Date Value Ref Range Status   04/25/2020 22.8 22.0 - 29.0 mmol/L Final     BUN   Date Value Ref Range Status   04/25/2020 11 6 - 20 mg/dL Final     Creatinine   Date Value Ref Range Status   04/25/2020 0.79 0.76 - 1.27 mg/dL Final     Glucose   Date Value Ref Range Status   04/25/2020 102 (H) 65 - 99 mg/dL Final     Calcium   Date Value Ref Range Status   04/25/2020 8.9 8.6 - 10.5 mg/dL Final     AST (SGOT)   Date Value Ref Range Status   04/25/2020 19 1 - 40 U/L Final     ALT (SGPT)   Date Value Ref  Range Status   04/25/2020 33 1 - 41 U/L Final     Alkaline Phosphatase   Date Value Ref Range Status   04/25/2020 77 39 - 117 U/L Final     No results found for: APTT, INR  No results found for: COLORU, CLARITYU, SPECGRAV, PHUR, PROTEINUR, GLUCOSEU, KETONESU, BLOODU, NITRITE, LEUKOCYTESUR, BILIRUBINUR, UROBILINOGEN, RBCUA, WBCUA, BACTERIA, UACOMMENT  Troponin T   Date Value Ref Range Status   04/25/2020 <0.010 0.000 - 0.030 ng/mL Final     No components found for: HGBA1C;2  No components found for: TSH;2  Imaging Results (All)     Procedure Component Value Units Date/Time    CT Angiogram Head [[id number]] Collected:  04/25/20 0330     Updated:  04/25/20 0345    Narrative:       CT ANGIOGRAM HEAD-, CT CEREBRAL PERFUSION W WO CONTRAST-, CT ANGIOGRAM  NECK-     CLINICAL HISTORY: Sudden onset of headache. Possible acute CVA.     TECHNIQUE: Transverse 3 mm thick images were obtained from the base of  the skull to the vertex without IV contrast. Subsequently brain  perfusion imaging was acquired. Spiral CT images were then acquired  through the neck and head during rapid IV injection of contrast and were  reconstructed in 1 mm thick axial slices. Multiple coronal and sagittal  and 3-D reconstructions were produced.     Radiation dose reduction techniques were utilized, including automated  exposure control and exposure modulation based on body size.     COMPARISON: None     FINDINGS: The precontrast images demonstrate no evidence of acute  infarct or hemorrhage. The brain and ventricular system appear  structurally normal. There is normal branching of the great vessels from  the aortic arch without NASCET significant stenosis. No stenoses are  identified in either common or external or internal carotid artery  within the neck. The carotid bifurcations appear normal. The vertebral  arteries are codominant, and are both widely patent throughout their  course in the neck and head. No intracranial stenoses or occlusions  are  identified. There are no aneurysms. The brain perfusion images  demonstrate no evidence of ischemia or infarction. Postcontrast images  of the brain parenchyma demonstrate no enhancing lesions. There are no  masses. Small polyps are noted within both maxillary sinuses.       Impression:       Unremarkable CT angiogram imaging of the neck and head  except for small polyps within both maxillary sinuses. There is no  evidence of cerebral infarction or ischemia.     This report was finalized on 4/25/2020 3:42 AM by Dr. Chano Espinoza M.D.       CT Cerebral Perfusion With & Without Contrast [[id number]] Collected:  04/25/20 0330     Updated:  04/25/20 0345    Narrative:       CT ANGIOGRAM HEAD-, CT CEREBRAL PERFUSION W WO CONTRAST-, CT ANGIOGRAM  NECK-     CLINICAL HISTORY: Sudden onset of headache. Possible acute CVA.     TECHNIQUE: Transverse 3 mm thick images were obtained from the base of  the skull to the vertex without IV contrast. Subsequently brain  perfusion imaging was acquired. Spiral CT images were then acquired  through the neck and head during rapid IV injection of contrast and were  reconstructed in 1 mm thick axial slices. Multiple coronal and sagittal  and 3-D reconstructions were produced.     Radiation dose reduction techniques were utilized, including automated  exposure control and exposure modulation based on body size.     COMPARISON: None     FINDINGS: The precontrast images demonstrate no evidence of acute  infarct or hemorrhage. The brain and ventricular system appear  structurally normal. There is normal branching of the great vessels from  the aortic arch without NASCET significant stenosis. No stenoses are  identified in either common or external or internal carotid artery  within the neck. The carotid bifurcations appear normal. The vertebral  arteries are codominant, and are both widely patent throughout their  course in the neck and head. No intracranial stenoses or occlusions  are  identified. There are no aneurysms. The brain perfusion images  demonstrate no evidence of ischemia or infarction. Postcontrast images  of the brain parenchyma demonstrate no enhancing lesions. There are no  masses. Small polyps are noted within both maxillary sinuses.       Impression:       Unremarkable CT angiogram imaging of the neck and head  except for small polyps within both maxillary sinuses. There is no  evidence of cerebral infarction or ischemia.     This report was finalized on 4/25/2020 3:42 AM by Dr. Chano Espinoza M.D.       CT Angiogram Neck [[id number]] Collected:  04/25/20 0330     Updated:  04/25/20 0345    Narrative:       CT ANGIOGRAM HEAD-, CT CEREBRAL PERFUSION W WO CONTRAST-, CT ANGIOGRAM  NECK-     CLINICAL HISTORY: Sudden onset of headache. Possible acute CVA.     TECHNIQUE: Transverse 3 mm thick images were obtained from the base of  the skull to the vertex without IV contrast. Subsequently brain  perfusion imaging was acquired. Spiral CT images were then acquired  through the neck and head during rapid IV injection of contrast and were  reconstructed in 1 mm thick axial slices. Multiple coronal and sagittal  and 3-D reconstructions were produced.     Radiation dose reduction techniques were utilized, including automated  exposure control and exposure modulation based on body size.     COMPARISON: None     FINDINGS: The precontrast images demonstrate no evidence of acute  infarct or hemorrhage. The brain and ventricular system appear  structurally normal. There is normal branching of the great vessels from  the aortic arch without NASCET significant stenosis. No stenoses are  identified in either common or external or internal carotid artery  within the neck. The carotid bifurcations appear normal. The vertebral  arteries are codominant, and are both widely patent throughout their  course in the neck and head. No intracranial stenoses or occlusions are  identified. There are no  aneurysms. The brain perfusion images  demonstrate no evidence of ischemia or infarction. Postcontrast images  of the brain parenchyma demonstrate no enhancing lesions. There are no  masses. Small polyps are noted within both maxillary sinuses.       Impression:       Unremarkable CT angiogram imaging of the neck and head  except for small polyps within both maxillary sinuses. There is no  evidence of cerebral infarction or ischemia.     This report was finalized on 4/25/2020 3:42 AM by Dr. Chano Espinoza M.D.       XR Spine Thoracic 3 View [[id number]] Collected:  04/25/20 0224     Updated:  04/25/20 0229    Narrative:       THORACIC SPINE AND CERVICAL SPINE X-RAYS.     CLINICAL HISTORY: Injury. Neck and back pain     Thoracic spine:     AP and lateral views demonstrate no abnormalities. There is normal  alignment. All of the vertebral bodies are normal in height. There is no  evidence of recent or old fracture.     Cervical spine:     5 views were obtained.     There is normal alignment. All of the disc spaces and vertebral bodies  are normal in height. There is no evidence of recent or old fracture or  subluxation.     This report was finalized on 4/25/2020 2:25 AM by Dr. Chano Espinoza M.D.       XR Spine Cervical Complete 4 or 5 View [[id number]] Collected:  04/25/20 0224     Updated:  04/25/20 0229    Narrative:       THORACIC SPINE AND CERVICAL SPINE X-RAYS.     CLINICAL HISTORY: Injury. Neck and back pain     Thoracic spine:     AP and lateral views demonstrate no abnormalities. There is normal  alignment. All of the vertebral bodies are normal in height. There is no  evidence of recent or old fracture.     Cervical spine:     5 views were obtained.     There is normal alignment. All of the disc spaces and vertebral bodies  are normal in height. There is no evidence of recent or old fracture or  subluxation.     This report was finalized on 4/25/2020 2:25 AM by Dr. Chano Espinoza M.D.           Lab Results  (last 7 days)     Procedure Component Value Units Date/Time    Basic Metabolic Panel [[id number]]  (Abnormal) Collected:  04/25/20 0839    Specimen:  Blood from Hand, Right Updated:  04/25/20 0916     Glucose 102 mg/dL      BUN 11 mg/dL      Creatinine 0.79 mg/dL      Sodium 134 mmol/L      Potassium 4.2 mmol/L      Chloride 100 mmol/L      CO2 22.8 mmol/L      Calcium 8.9 mg/dL      eGFR Non African Amer 113 mL/min/1.73      BUN/Creatinine Ratio 13.9     Anion Gap 11.2 mmol/L     Narrative:       GFR Normal >60  Chronic Kidney Disease <60  Kidney Failure <15      CBC (No Diff) [[id number]]  (Normal) Collected:  04/25/20 0839    Specimen:  Blood Updated:  04/25/20 0851     WBC 7.85 10*3/mm3      RBC 5.01 10*6/mm3      Hemoglobin 14.4 g/dL      Hematocrit 43.0 %      MCV 85.8 fL      MCH 28.7 pg      MCHC 33.5 g/dL      RDW 13.2 %      RDW-SD 41.0 fl      MPV 10.3 fL      Platelets 230 10*3/mm3     Troponin [[id number]]  (Normal) Collected:  04/25/20 0146    Specimen:  Blood Updated:  04/25/20 0551     Troponin T <0.010 ng/mL     Narrative:       Troponin T Reference Range:  <= 0.03 ng/mL-   Negative for AMI  >0.03 ng/mL-     Abnormal for myocardial necrosis.  Clinicians would have to utilize clinical acumen, EKG, Troponin and serial changes to determine if it is an Acute Myocardial Infarction or myocardial injury due to an underlying chronic condition.       Results may be falsely decreased if patient taking Biotin.      Comprehensive Metabolic Panel [[id number]]  (Abnormal) Collected:  04/25/20 0146    Specimen:  Blood Updated:  04/25/20 0216     Glucose 108 mg/dL      BUN 11 mg/dL      Creatinine 0.88 mg/dL      Sodium 137 mmol/L      Potassium 4.3 mmol/L      Chloride 100 mmol/L      CO2 26.4 mmol/L      Calcium 9.3 mg/dL      Total Protein 7.1 g/dL      Albumin 4.10 g/dL      ALT (SGPT) 33 U/L      AST (SGOT) 19 U/L      Alkaline Phosphatase 77 U/L      Total Bilirubin 0.4 mg/dL      eGFR Non African Amer 100  "mL/min/1.73      Globulin 3.0 gm/dL      A/G Ratio 1.4 g/dL      BUN/Creatinine Ratio 12.5     Anion Gap 10.6 mmol/L     Narrative:       GFR Normal >60  Chronic Kidney Disease <60  Kidney Failure <15      CBC & Differential [[id number]] Collected:  04/25/20 0146    Specimen:  Blood Updated:  04/25/20 0155    Narrative:       The following orders were created for panel order CBC & Differential.  Procedure                               Abnormality         Status                     ---------                               -----------         ------                     CBC Auto Differential[[id number]]        Normal              Final result                 Please view results for these tests on the individual orders.    CBC Auto Differential [[id number]]  (Normal) Collected:  04/25/20 0146    Specimen:  Blood Updated:  04/25/20 0155     WBC 9.66 10*3/mm3      RBC 5.18 10*6/mm3      Hemoglobin 14.7 g/dL      Hematocrit 44.8 %      MCV 86.5 fL      MCH 28.4 pg      MCHC 32.8 g/dL      RDW 13.0 %      RDW-SD 40.9 fl      MPV 10.4 fL      Platelets 223 10*3/mm3      Neutrophil % 66.8 %      Lymphocyte % 22.5 %      Monocyte % 9.1 %      Eosinophil % 0.7 %      Basophil % 0.5 %      Immature Grans % 0.4 %      Neutrophils, Absolute 6.45 10*3/mm3      Lymphocytes, Absolute 2.17 10*3/mm3      Monocytes, Absolute 0.88 10*3/mm3      Eosinophils, Absolute 0.07 10*3/mm3      Basophils, Absolute 0.05 10*3/mm3      Immature Grans, Absolute 0.04 10*3/mm3      nRBC 0.0 /100 WBC         /85 (Patient Position: Lying)   Pulse 87   Temp 98 °F (36.7 °C) (Oral)   Resp 18   Ht 180.3 cm (71\")   Wt 90.7 kg (200 lb)   SpO2 94%   BMI 27.89 kg/m²     Discharge Exam:  General Appearance:    Alert, cooperative, no distress                          Head:    Normocephalic, without obvious abnormality, atraumatic                          Eyes:                            Throat:   Lips, tongue, gums normal                          Neck:   " Supple, symmetrical, trachea midline, no JVD                        Lungs:     Clear to auscultation bilaterally, respirations unlabored                Chest Wall:    No tenderness or deformity                        Heart:    Regular rate and rhythm, S1 and S2 normal, no murmur,no  Rub or gallop                  Abdomen:     Soft, non-tender, bowel sounds active, no masses, no organomegaly                  Extremities:   Extremities normal, atraumatic, no cyanosis or edema                             Skin:   Skin is warm and dry,  no rashes or palpable lesions                  Neurologic:   no focal deficits noted     Disposition:  Left AGAINST MEDICAL ADVICE    Patient Instructions:      Discharge Medications      ASK your doctor about these medications      Instructions Start Date   amphetamine-dextroamphetamine 10 MG tablet  Commonly known as:  ADDERALL   10 mg, Oral, 2 Times Daily      busPIRone 5 MG tablet  Commonly known as:  BUSPAR   5 mg, Oral, 3 Times Daily      diphenhydrAMINE 25 MG tablet  Commonly known as:  BENADRYL   25 mg, Oral, Every 6 Hours PRN      lisinopril 5 MG tablet  Commonly known as:  PRINIVIL,ZESTRIL   5 mg, Oral, Daily           No future appointments.  Follow-up Information     Cee Elaine, APRN .    Specialty:  Family Medicine  Contact information:  [address] [facility] [address] [address]  [phone]                 Discharge Order (From admission, onward)    None          Total time spent discharging patient including evaluation,post hospitalization follow up,  medication and post hospitalization instructions and education total time exceeds 30 minutes.    Signed:  Gerry Wilkinson MD  4/25/2020  15:38

## 2020-04-25 NOTE — ED PROVIDER NOTES
EMERGENCY DEPARTMENT ENCOUNTER    Room Number:  01/01  Date of encounter:  4/25/2020   PCP: Cee Elaine APRN  Historian: Patient      HPI:  Chief Complaint: Sudden onset of blurry vision, vertigo, neck pain, and mid back pain.      Context: Adrian Montelongo is a 34 y.o. male who presents to the ED c/o sudden onset of blurry vision, vertigo, neck pain and mid back pain that occurred approximately 7-1/2 hours ago while mowing his lawn.  The patient states he did not see a ditch/drop-off and came down hard on his left foot while mowing the lawn.  All his symptoms immediately began after this TITO.  He states initially the blurry vision was mild but has progressed over the past several hours.  He states that this particular time he can see pictures but cannot make out any of the details.  He states his vision is normally 20/20 and he has not had to wear corrective lenses to date.  He denies headache, palpitations, chest pain, abdominal pain, fever/chills.  He is unaware of any exposure to sick contacts prior to the onset of his symptoms.    PAST MEDICAL HISTORY  Active Ambulatory Problems     Diagnosis Date Noted   • No Active Ambulatory Problems     Resolved Ambulatory Problems     Diagnosis Date Noted   • No Resolved Ambulatory Problems     Past Medical History:   Diagnosis Date   • ADHD (attention deficit hyperactivity disorder)    • Anxiety          PAST SURGICAL HISTORY  Past Surgical History:   Procedure Laterality Date   • KNEE SURGERY           FAMILY HISTORY  Family History   Problem Relation Age of Onset   • Lung cancer Mother    • No Known Problems Father    • Skin cancer Sister    • No Known Problems Maternal Grandmother    • Alzheimer's disease Maternal Grandfather    • No Known Problems Paternal Grandmother    • Unexplained death Paternal Grandfather         MVA         SOCIAL HISTORY  Social History     Socioeconomic History   • Marital status:      Spouse name: Not on file   • Number of  children: Not on file   • Years of education: Not on file   • Highest education level: Not on file   Tobacco Use   • Smoking status: Never Smoker   • Smokeless tobacco: Never Used   Substance and Sexual Activity   • Alcohol use: No     Frequency: Never   • Drug use: No   • Sexual activity: Yes     Partners: Female         ALLERGIES  Patient has no known allergies.        REVIEW OF SYSTEMS  Review of Systems     All systems reviewed and negative except for those discussed in HPI.       PHYSICAL EXAM    I have reviewed the triage vital signs and nursing notes.    ED Triage Vitals [04/25/20 0106]   Temp Heart Rate Resp BP SpO2   96.8 °F (36 °C) 106 16 -- 96 %      Temp src Heart Rate Source Patient Position BP Location FiO2 (%)   Tympanic Monitor -- -- --       Physical Exam  GENERAL: Mild distress  HENT: nares patent, mucous membranes normal  NECK:  No nuchal rigidity  EYES: no scleral icterus, conjunctiva normal, PERRL, next ocular movements intact without nystagmus, no hyphema or signs of trauma.  Peripheral vision intact bilaterally  CV: regular rhythm, sinus tach, without rubs murmurs gallops  RESPIRATORY: normal effort, lungs clear to auscultation bilaterally  ABDOMEN: soft, nontender  MUSCULOSKELETAL: no deformity, TTP at the base of the C-spine without deformity.  TTP T1-T10 without deformity.  NEURO: alert and oriented x4, moves all extremities, follows commands.  Strength 5 of 5 bilateral upper and lower extremity sharp sensation intact bilateral upper and lower extremities.  Cerebellar/finger-to-nose exam intact bilateral.  Negative Romberg exam.  No facial asymmetry, no aphasia, no dysarthria on exam.  Other than decreased visual acuity there are no focal neuro deficits on PE.  Remainder the patient's cranial nerves are normal as tested.  SKIN: warm, dry, no skin rash noted        LAB RESULTS  Recent Results (from the past 24 hour(s))   Comprehensive Metabolic Panel    Collection Time: 04/25/20  1:46 AM    Result Value Ref Range    Glucose 108 (H) 65 - 99 mg/dL    BUN 11 6 - 20 mg/dL    Creatinine 0.88 0.76 - 1.27 mg/dL    Sodium 137 136 - 145 mmol/L    Potassium 4.3 3.5 - 5.2 mmol/L    Chloride 100 98 - 107 mmol/L    CO2 26.4 22.0 - 29.0 mmol/L    Calcium 9.3 8.6 - 10.5 mg/dL    Total Protein 7.1 6.0 - 8.5 g/dL    Albumin 4.10 3.50 - 5.20 g/dL    ALT (SGPT) 33 1 - 41 U/L    AST (SGOT) 19 1 - 40 U/L    Alkaline Phosphatase 77 39 - 117 U/L    Total Bilirubin 0.4 0.2 - 1.2 mg/dL    eGFR Non African Amer 100 >60 mL/min/1.73    Globulin 3.0 gm/dL    A/G Ratio 1.4 g/dL    BUN/Creatinine Ratio 12.5 7.0 - 25.0    Anion Gap 10.6 5.0 - 15.0 mmol/L   CBC Auto Differential    Collection Time: 04/25/20  1:46 AM   Result Value Ref Range    WBC 9.66 3.40 - 10.80 10*3/mm3    RBC 5.18 4.14 - 5.80 10*6/mm3    Hemoglobin 14.7 13.0 - 17.7 g/dL    Hematocrit 44.8 37.5 - 51.0 %    MCV 86.5 79.0 - 97.0 fL    MCH 28.4 26.6 - 33.0 pg    MCHC 32.8 31.5 - 35.7 g/dL    RDW 13.0 12.3 - 15.4 %    RDW-SD 40.9 37.0 - 54.0 fl    MPV 10.4 6.0 - 12.0 fL    Platelets 223 140 - 450 10*3/mm3    Neutrophil % 66.8 42.7 - 76.0 %    Lymphocyte % 22.5 19.6 - 45.3 %    Monocyte % 9.1 5.0 - 12.0 %    Eosinophil % 0.7 0.3 - 6.2 %    Basophil % 0.5 0.0 - 1.5 %    Immature Grans % 0.4 0.0 - 0.5 %    Neutrophils, Absolute 6.45 1.70 - 7.00 10*3/mm3    Lymphocytes, Absolute 2.17 0.70 - 3.10 10*3/mm3    Monocytes, Absolute 0.88 0.10 - 0.90 10*3/mm3    Eosinophils, Absolute 0.07 0.00 - 0.40 10*3/mm3    Basophils, Absolute 0.05 0.00 - 0.20 10*3/mm3    Immature Grans, Absolute 0.04 0.00 - 0.05 10*3/mm3    nRBC 0.0 0.0 - 0.2 /100 WBC   Troponin    Collection Time: 04/25/20  1:46 AM   Result Value Ref Range    Troponin T <0.010 0.000 - 0.030 ng/mL       Ordered the above labs and independently reviewed the results.        RADIOLOGY  Ct Angiogram Head    Result Date: 4/25/2020  CT ANGIOGRAM HEAD-, CT CEREBRAL PERFUSION W WO CONTRAST-, CT ANGIOGRAM NECK-  CLINICAL HISTORY:  Sudden onset of headache. Possible acute CVA.  TECHNIQUE: Transverse 3 mm thick images were obtained from the base of the skull to the vertex without IV contrast. Subsequently brain perfusion imaging was acquired. Spiral CT images were then acquired through the neck and head during rapid IV injection of contrast and were reconstructed in 1 mm thick axial slices. Multiple coronal and sagittal and 3-D reconstructions were produced.  Radiation dose reduction techniques were utilized, including automated exposure control and exposure modulation based on body size.  COMPARISON: None  FINDINGS: The precontrast images demonstrate no evidence of acute infarct or hemorrhage. The brain and ventricular system appear structurally normal. There is normal branching of the great vessels from the aortic arch without NASCET significant stenosis. No stenoses are identified in either common or external or internal carotid artery within the neck. The carotid bifurcations appear normal. The vertebral arteries are codominant, and are both widely patent throughout their course in the neck and head. No intracranial stenoses or occlusions are identified. There are no aneurysms. The brain perfusion images demonstrate no evidence of ischemia or infarction. Postcontrast images of the brain parenchyma demonstrate no enhancing lesions. There are no masses. Small polyps are noted within both maxillary sinuses.      Unremarkable CT angiogram imaging of the neck and head except for small polyps within both maxillary sinuses. There is no evidence of cerebral infarction or ischemia.  This report was finalized on 4/25/2020 3:42 AM by Dr. Chano Espinoza M.D.      Xr Spine Cervical Complete 4 Or 5 View    Result Date: 4/25/2020  THORACIC SPINE AND CERVICAL SPINE X-RAYS.  CLINICAL HISTORY: Injury. Neck and back pain  Thoracic spine:  AP and lateral views demonstrate no abnormalities. There is normal alignment. All of the vertebral bodies are normal in  height. There is no evidence of recent or old fracture.  Cervical spine:  5 views were obtained.  There is normal alignment. All of the disc spaces and vertebral bodies are normal in height. There is no evidence of recent or old fracture or subluxation.  This report was finalized on 4/25/2020 2:25 AM by Dr. Chano Espinoza M.D.      Xr Spine Thoracic 3 View    Result Date: 4/25/2020  THORACIC SPINE AND CERVICAL SPINE X-RAYS.  CLINICAL HISTORY: Injury. Neck and back pain  Thoracic spine:  AP and lateral views demonstrate no abnormalities. There is normal alignment. All of the vertebral bodies are normal in height. There is no evidence of recent or old fracture.  Cervical spine:  5 views were obtained.  There is normal alignment. All of the disc spaces and vertebral bodies are normal in height. There is no evidence of recent or old fracture or subluxation.  This report was finalized on 4/25/2020 2:25 AM by Dr. Chano Espinoza M.D.      Ct Angiogram Neck    Result Date: 4/25/2020  CT ANGIOGRAM HEAD-, CT CEREBRAL PERFUSION W WO CONTRAST-, CT ANGIOGRAM NECK-  CLINICAL HISTORY: Sudden onset of headache. Possible acute CVA.  TECHNIQUE: Transverse 3 mm thick images were obtained from the base of the skull to the vertex without IV contrast. Subsequently brain perfusion imaging was acquired. Spiral CT images were then acquired through the neck and head during rapid IV injection of contrast and were reconstructed in 1 mm thick axial slices. Multiple coronal and sagittal and 3-D reconstructions were produced.  Radiation dose reduction techniques were utilized, including automated exposure control and exposure modulation based on body size.  COMPARISON: None  FINDINGS: The precontrast images demonstrate no evidence of acute infarct or hemorrhage. The brain and ventricular system appear structurally normal. There is normal branching of the great vessels from the aortic arch without NASCET significant stenosis. No stenoses are  identified in either common or external or internal carotid artery within the neck. The carotid bifurcations appear normal. The vertebral arteries are codominant, and are both widely patent throughout their course in the neck and head. No intracranial stenoses or occlusions are identified. There are no aneurysms. The brain perfusion images demonstrate no evidence of ischemia or infarction. Postcontrast images of the brain parenchyma demonstrate no enhancing lesions. There are no masses. Small polyps are noted within both maxillary sinuses.      Unremarkable CT angiogram imaging of the neck and head except for small polyps within both maxillary sinuses. There is no evidence of cerebral infarction or ischemia.  This report was finalized on 4/25/2020 3:42 AM by Dr. Chano Espinoza M.D.      Ct Cerebral Perfusion With & Without Contrast    Result Date: 4/25/2020  CT ANGIOGRAM HEAD-, CT CEREBRAL PERFUSION W WO CONTRAST-, CT ANGIOGRAM NECK-  CLINICAL HISTORY: Sudden onset of headache. Possible acute CVA.  TECHNIQUE: Transverse 3 mm thick images were obtained from the base of the skull to the vertex without IV contrast. Subsequently brain perfusion imaging was acquired. Spiral CT images were then acquired through the neck and head during rapid IV injection of contrast and were reconstructed in 1 mm thick axial slices. Multiple coronal and sagittal and 3-D reconstructions were produced.  Radiation dose reduction techniques were utilized, including automated exposure control and exposure modulation based on body size.  COMPARISON: None  FINDINGS: The precontrast images demonstrate no evidence of acute infarct or hemorrhage. The brain and ventricular system appear structurally normal. There is normal branching of the great vessels from the aortic arch without NASCET significant stenosis. No stenoses are identified in either common or external or internal carotid artery within the neck. The carotid bifurcations appear normal.  The vertebral arteries are codominant, and are both widely patent throughout their course in the neck and head. No intracranial stenoses or occlusions are identified. There are no aneurysms. The brain perfusion images demonstrate no evidence of ischemia or infarction. Postcontrast images of the brain parenchyma demonstrate no enhancing lesions. There are no masses. Small polyps are noted within both maxillary sinuses.      Unremarkable CT angiogram imaging of the neck and head except for small polyps within both maxillary sinuses. There is no evidence of cerebral infarction or ischemia.  This report was finalized on 4/25/2020 3:42 AM by Dr. Chano Espinoza M.D.        I ordered the above noted radiological studies. Reviewed by me and discussed with radiologist.  See dictation for official radiology interpretation.      PROCEDURES    Procedures      MEDICATIONS GIVEN IN ER    Medications   prochlorperazine (COMPAZINE) injection 10 mg (10 mg Intravenous Given 4/25/20 0086)   sodium chloride 0.9 % flush 10 mL (has no administration in time range)   sodium chloride 0.9 % flush 10 mL (has no administration in time range)   sodium chloride 0.9 % infusion (has no administration in time range)   acetaminophen (TYLENOL) tablet 650 mg (has no administration in time range)     Or   acetaminophen (TYLENOL) 160 MG/5ML solution 650 mg (has no administration in time range)     Or   acetaminophen (TYLENOL) suppository 650 mg (has no administration in time range)   bisacodyl (DULCOLAX) EC tablet 5 mg (has no administration in time range)   ondansetron (ZOFRAN) tablet 4 mg (has no administration in time range)     Or   ondansetron (ZOFRAN) injection 4 mg (has no administration in time range)   calcium carbonate (TUMS) chewable tablet 500 mg (200 mg elemental) (has no administration in time range)   HYDROmorphone (DILAUDID) injection 0.5 mg (0.5 mg Intravenous Given 4/25/20 0244)   ondansetron (ZOFRAN) injection 4 mg (4 mg Intravenous  Given 4/25/20 0244)   iopamidol (ISOVUE-370) 76 % injection 150 mL (150 mL Intravenous Given by Other 4/25/20 0317)   HYDROmorphone (DILAUDID) injection 1 mg (1 mg Intravenous Given 4/25/20 0453)   diphenhydrAMINE (BENADRYL) injection 25 mg (25 mg Intravenous Given 4/25/20 0516)   aspirin tablet 325 mg (325 mg Oral Given 4/25/20 0518)   sodium chloride 0.9 % bolus 1,000 mL (1,000 mL Intravenous New Bag 4/25/20 0515)         PROGRESS, DATA ANALYSIS, CONSULTS, AND MEDICAL DECISION MAKING    All labs have been independently reviewed by me.  All radiology studies have been reviewed by me and discussed with radiologist dictating the report.   EKG's independently viewed and interpreted by me.  Discussion below represents my analysis of pertinent findings related to patient's condition, differential diagnosis, treatment plan and final disposition.    Differential diagnosis: ICH, intracranial mass, atypical migraine, cervical strain, thoracic strain.      0230: Discussed patient case with Dr Jackson my supervising physician will call the on-call interventional neurologist to discuss work-up.    0240: Discussed patient case with Dr. Moss to get CT of the head without contrast, CT angiogram of head and neck with perfusion studies.    0420: Dr. Jules radiologist discussed results of the CAT scans with Dr. Jackson.  There were no acute abnormality seen.  The patient is still in pain and complaining of diplopia blurry vision vertigo and back pain at this time.  Discussed with Dr. Jackson, plan to admit patient for further evaluation and care.    0451: Discussed patient case with Dr. Moss.  Reviewed the CT studies and discussed the lab findings.  Discussed that the patient is asymptomatic.  To treat patient with a migraine cocktail to see if his symptoms are related to an atypical migraine.  Explained will admit to medicine at this time.  Dr. Reinoso says he will see the patient in consult and  instructed us to go ahead and give  325 ASA at this time.    0452: Call placed [facility]    0520: Discussed with FARTUN Corona with [facility] to place patient in observation at this time neuro monitor.      ED Course as of Apr 25 0643   Sat Apr 25, 2020   0637 EKG          EKG time: 0250  Rhythm/Rate: 118/Sinus Tach  P waves and DE: No acute cgs  QRS, axis: Normal Axis   ST and T waves: No acute changes     Interpreted Contemporaneously by me, independently viewed  -No prior to compare.    [RC]      ED Course User Index  [RC] Stone Pina III, PA           Patient was placed in face mask in first look. Patient was wearing facemask when I entered the room and throughout our encounter. I wore full protective equipment throughout this patient encounter including a face mask, and gloves. Hand hygiene was performed before donning protective equipment and after removal when leaving the room.    AS OF 06:43 VITALS:    BP - 102/75  HR - 93  TEMP - 96.8 °F (36 °C) (Tympanic)  O2 SATS - 94%        DIAGNOSIS  Final diagnoses:   Blurry vision, bilateral   Double vision   Neck pain   Mid back pain   Vertigo         DISPOSITION  ADMISSION    Discussed treatment plan and reason for admission with pt/family and admitting physician.  Pt/family voiced understanding of the plan for admission for further testing/treatment as needed.              Stone Pina III, PA  04/25/20 0556       Stone Pina III, PA  04/25/20 0643

## 2020-04-27 NOTE — PROGRESS NOTES
Case Management Discharge Note      Final Note: home no additional dc orders noted for CCP. Nils MEYER/CCP         Destination      No service has been selected for the patient.      Durable Medical Equipment      No service has been selected for the patient.      Dialysis/Infusion      No service has been selected for the patient.      Home Medical Care      No service has been selected for the patient.      Therapy      No service has been selected for the patient.      Community Resources      No service has been selected for the patient.        Transportation Services  Private: Car    Final Discharge Disposition Code: 01 - home or self-care

## 2021-04-16 ENCOUNTER — BULK ORDERING (OUTPATIENT)
Dept: CASE MANAGEMENT | Facility: OTHER | Age: 36
End: 2021-04-16

## 2021-04-16 DIAGNOSIS — Z23 IMMUNIZATION DUE: ICD-10-CM

## 2023-11-08 NOTE — ED TRIAGE NOTES
To ER via [facility].  C/o blurred vision and double vision since this afternoon.  Pt states he was cutting grass and stepped wrong and felt a pop in his back.  Pt states at that time he started having problems with his vision.      Pt was started on Lisinopril 5 mg daily 2 days ago.    
Plan: Short showers, short baths no more than 5 minutes with lukewarm water.
Detail Level: Zone
Otc Regimen: CLN body wash\\nFragrance free moisturizers twice daily